# Patient Record
Sex: FEMALE | Race: WHITE | Employment: FULL TIME | URBAN - METROPOLITAN AREA
[De-identification: names, ages, dates, MRNs, and addresses within clinical notes are randomized per-mention and may not be internally consistent; named-entity substitution may affect disease eponyms.]

---

## 2017-01-26 ENCOUNTER — HOSPITAL ENCOUNTER (EMERGENCY)
Facility: HOSPITAL | Age: 22
Discharge: HOME | End: 2017-01-26
Attending: EMERGENCY MEDICINE
Payer: COMMERCIAL

## 2017-01-26 VITALS
BODY MASS INDEX: 34.36 KG/M2 | TEMPERATURE: 100 F | DIASTOLIC BLOOD PRESSURE: 58 MMHG | SYSTOLIC BLOOD PRESSURE: 111 MMHG | HEART RATE: 104 BPM | WEIGHT: 182 LBS | OXYGEN SATURATION: 100 % | RESPIRATION RATE: 20 BRPM | HEIGHT: 61 IN

## 2017-01-26 DIAGNOSIS — J11.1 INFLUENZA-LIKE ILLNESS: Primary | ICD-10-CM

## 2017-01-26 LAB
ANION GAP SERPL CALC-SCNC: 12 MMOL/L (ref 0–18)
B-HCG UR QL: NEGATIVE
BASOPHILS # BLD: 0 K/UL (ref 0–0.2)
BASOPHILS NFR BLD: 0 %
BILIRUB UR QL: NEGATIVE
BUN SERPL-MCNC: 14 MG/DL (ref 8–20)
BUN/CREAT SERPL: 17.9 (ref 10–20)
CALCIUM SERPL-MCNC: 9 MG/DL (ref 8.5–10.5)
CHLORIDE SERPL-SCNC: 100 MMOL/L (ref 95–110)
CLARITY UR: CLEAR
CO2 SERPL-SCNC: 23 MMOL/L (ref 22–32)
COLOR UR: YELLOW
CREAT SERPL-MCNC: 0.78 MG/DL (ref 0.5–1.5)
EOSINOPHIL # BLD: 0 K/UL (ref 0–0.7)
EOSINOPHIL NFR BLD: 0 %
ERYTHROCYTE [DISTWIDTH] IN BLOOD BY AUTOMATED COUNT: 12.7 % (ref 11–15)
GLUCOSE SERPL-MCNC: 105 MG/DL (ref 70–99)
GLUCOSE UR-MCNC: NEGATIVE MG/DL
HCT VFR BLD AUTO: 39.1 % (ref 35–48)
HGB BLD-MCNC: 13.2 G/DL (ref 12–16)
KETONES UR-MCNC: NEGATIVE MG/DL
LYMPHOCYTES # BLD: 0.4 K/UL (ref 1–4)
LYMPHOCYTES NFR BLD: 5 %
MCH RBC QN AUTO: 27.2 PG (ref 27–32)
MCHC RBC AUTO-ENTMCNC: 33.8 G/DL (ref 32–37)
MCV RBC AUTO: 80.5 FL (ref 80–100)
MONOCYTES # BLD: 0.6 K/UL (ref 0–1)
MONOCYTES NFR BLD: 8 %
NEUTROPHILS # BLD AUTO: 7.5 K/UL (ref 1.8–7.7)
NEUTROPHILS NFR BLD: 87 %
NITRITE UR QL STRIP.AUTO: NEGATIVE
OSMOLALITY UR CALC.SUM OF ELEC: 281 MOSM/KG (ref 275–295)
PH UR: 8 [PH] (ref 5–8)
PLATELET # BLD AUTO: 244 K/UL (ref 140–400)
PMV BLD AUTO: 8 FL (ref 7.4–10.3)
POTASSIUM SERPL-SCNC: 3.8 MMOL/L (ref 3.3–5.1)
PROT UR-MCNC: NEGATIVE MG/DL
RBC # BLD AUTO: 4.86 M/UL (ref 3.7–5.4)
RBC #/AREA URNS AUTO: 2 /HPF
SODIUM SERPL-SCNC: 135 MMOL/L (ref 136–144)
SP GR UR STRIP: 1.01 (ref 1–1.03)
UROBILINOGEN UR STRIP-ACNC: <2
VIT C UR-MCNC: NEGATIVE MG/DL
WBC # BLD AUTO: 8.6 K/UL (ref 4–11)
WBC #/AREA URNS AUTO: 1 /HPF

## 2017-01-26 PROCEDURE — 81025 URINE PREGNANCY TEST: CPT

## 2017-01-26 PROCEDURE — 36415 COLL VENOUS BLD VENIPUNCTURE: CPT

## 2017-01-26 PROCEDURE — 80048 BASIC METABOLIC PNL TOTAL CA: CPT | Performed by: EMERGENCY MEDICINE

## 2017-01-26 PROCEDURE — 99283 EMERGENCY DEPT VISIT LOW MDM: CPT

## 2017-01-26 PROCEDURE — 85025 COMPLETE CBC W/AUTO DIFF WBC: CPT | Performed by: EMERGENCY MEDICINE

## 2017-01-26 PROCEDURE — 81001 URINALYSIS AUTO W/SCOPE: CPT

## 2017-01-26 RX ORDER — ONDANSETRON 4 MG/1
4 TABLET, ORALLY DISINTEGRATING ORAL ONCE
Status: COMPLETED | OUTPATIENT
Start: 2017-01-26 | End: 2017-01-26

## 2017-01-26 RX ORDER — IBUPROFEN 600 MG/1
600 TABLET ORAL ONCE
Status: COMPLETED | OUTPATIENT
Start: 2017-01-26 | End: 2017-01-26

## 2017-01-26 RX ORDER — ONDANSETRON 4 MG/1
TABLET, ORALLY DISINTEGRATING ORAL
Status: COMPLETED
Start: 2017-01-26 | End: 2017-01-26

## 2017-01-26 NOTE — ED PROVIDER NOTES
Patient Seen in: [facility] AND [facility] Emergency Department    History   Patient presents with:  Fever Sepsis (infectious)    Stated Complaint:     HPI    23 yo female with shaking chills, nausea and body aches. No cough. Has had sore throat.  Low grade fever Normal range of motion. She exhibits no edema or tenderness. Lymphadenopathy:     She has no cervical adenopathy. Neurological: She is alert and oriented to person, place, and time. No cranial nerve deficit. Skin: Skin is warm and dry.    Psychiatric:

## 2017-03-25 ENCOUNTER — OFFICE VISIT (OUTPATIENT)
Dept: FAMILY MEDICINE CLINIC | Facility: CLINIC | Age: 22
End: 2017-03-25

## 2017-03-25 VITALS
TEMPERATURE: 98 F | BODY MASS INDEX: 32.6 KG/M2 | HEIGHT: 63.25 IN | HEART RATE: 93 BPM | WEIGHT: 186.31 LBS | DIASTOLIC BLOOD PRESSURE: 76 MMHG | SYSTOLIC BLOOD PRESSURE: 111 MMHG

## 2017-03-25 DIAGNOSIS — Z00.00 ROUTINE GENERAL MEDICAL EXAMINATION AT A HEALTH CARE FACILITY: Primary | ICD-10-CM

## 2017-03-25 DIAGNOSIS — Z11.1 SCREENING FOR TUBERCULOSIS: ICD-10-CM

## 2017-03-25 PROCEDURE — 90471 IMMUNIZATION ADMIN: CPT | Performed by: PHYSICIAN ASSISTANT

## 2017-03-25 PROCEDURE — 86580 TB INTRADERMAL TEST: CPT | Performed by: PHYSICIAN ASSISTANT

## 2017-03-25 PROCEDURE — 90707 MMR VACCINE SC: CPT | Performed by: PHYSICIAN ASSISTANT

## 2017-03-25 PROCEDURE — 99385 PREV VISIT NEW AGE 18-39: CPT | Performed by: PHYSICIAN ASSISTANT

## 2017-03-25 NOTE — PROGRESS NOTES
HPI:   Court Cano is a 24year old female who presents for physical exam and Tb test for work. 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 She denies any significant medical history. Periods are regular. She denies acute problems or concerns.     Current M splenectomy. ENDOCRINOLOGIC:  Denies excessive sweating, cold or heat intolerance, polyuria or polydipsia. ALLERGIES:  Denies severe allergic response, history of asthma, sneezing, hives, eczema or rhinitis.     Exam:   /76 mmHg  Pulse 93  Temp(Src) 3/25/2018).     RENATA Dye  3/25/2017

## 2017-03-27 ENCOUNTER — NURSE ONLY (OUTPATIENT)
Dept: FAMILY MEDICINE CLINIC | Facility: CLINIC | Age: 22
End: 2017-03-27

## 2017-03-27 DIAGNOSIS — Z23 IMMUNIZATION DUE: Primary | ICD-10-CM

## 2017-03-27 LAB — INDURATION (): 0 MM (ref 0–11)

## 2017-05-17 ENCOUNTER — OFFICE VISIT (OUTPATIENT)
Dept: FAMILY MEDICINE CLINIC | Facility: CLINIC | Age: 22
End: 2017-05-17

## 2017-05-17 VITALS
WEIGHT: 185.5 LBS | TEMPERATURE: 99 F | BODY MASS INDEX: 33 KG/M2 | DIASTOLIC BLOOD PRESSURE: 73 MMHG | HEART RATE: 81 BPM | SYSTOLIC BLOOD PRESSURE: 106 MMHG

## 2017-05-17 DIAGNOSIS — H00.021 HORDEOLUM INTERNUM OF RIGHT UPPER EYELID: Primary | ICD-10-CM

## 2017-05-17 PROCEDURE — 99212 OFFICE O/P EST SF 10 MIN: CPT | Performed by: PHYSICIAN ASSISTANT

## 2017-05-17 PROCEDURE — 99213 OFFICE O/P EST LOW 20 MIN: CPT | Performed by: PHYSICIAN ASSISTANT

## 2017-05-17 RX ORDER — OFLOXACIN 3 MG/ML
1 SOLUTION/ DROPS OPHTHALMIC 4 TIMES DAILY
Qty: 10 ML | Refills: 0 | Status: SHIPPED | OUTPATIENT
Start: 2017-05-17 | End: 2017-09-06 | Stop reason: ALTCHOICE

## 2017-05-17 RX ORDER — ERYTHROMYCIN 5 MG/G
1 OINTMENT OPHTHALMIC 2 TIMES DAILY
Qty: 3.5 G | Refills: 0 | Status: SHIPPED | OUTPATIENT
Start: 2017-05-17 | End: 2017-09-06 | Stop reason: ALTCHOICE

## 2017-05-17 NOTE — PROGRESS NOTES
HPI:    Patient ID: Desmond Bailey is a 25year old female. HPI Comments: Patient presents for swelling and redness of right upper eyelid for past two days. She has had mucus drainage from eye. She has mild pain in eyelid and area has been itchy.   Brian Doyle No cranial nerve deficit. Skin: Skin is warm and dry. Psychiatric: She has a normal mood and affect. Vitals reviewed.              ASSESSMENT/PLAN:   Hordeolum internum of right upper eyelid  (primary encounter diagnosis)  -Ofloxacin drops and erythro

## 2017-06-13 ENCOUNTER — TELEPHONE (OUTPATIENT)
Dept: FAMILY MEDICINE CLINIC | Facility: CLINIC | Age: 22
End: 2017-06-13

## 2017-06-13 NOTE — TELEPHONE ENCOUNTER
Pt calling and stts she faxed over DCFS forms for her to open a . Pt stts she faxed them on Thursday 6/08. Pt stts that it was for Dr. Olman Plata to fill out. Pt would like to know if they were received and filled out?   Please fax #[phone]

## 2017-09-06 ENCOUNTER — OFFICE VISIT (OUTPATIENT)
Dept: FAMILY MEDICINE CLINIC | Facility: CLINIC | Age: 22
End: 2017-09-06

## 2017-09-06 ENCOUNTER — APPOINTMENT (OUTPATIENT)
Dept: LAB | Age: 22
End: 2017-09-06
Attending: PHYSICIAN ASSISTANT
Payer: COMMERCIAL

## 2017-09-06 VITALS
HEART RATE: 71 BPM | HEIGHT: 63 IN | BODY MASS INDEX: 32.43 KG/M2 | WEIGHT: 183 LBS | SYSTOLIC BLOOD PRESSURE: 123 MMHG | DIASTOLIC BLOOD PRESSURE: 75 MMHG

## 2017-09-06 DIAGNOSIS — N92.6 IRREGULAR PERIODS: ICD-10-CM

## 2017-09-06 DIAGNOSIS — N92.6 MISSED PERIOD: ICD-10-CM

## 2017-09-06 DIAGNOSIS — N92.6 MISSED PERIOD: Primary | ICD-10-CM

## 2017-09-06 LAB
B-HCG SERPL-ACNC: <0.6 MIU/ML
CONTROL LINE PRESENT WITH A CLEAR BACKGROUND (YES/NO): YES YES/NO
KIT LOT #: NORMAL NUMERIC
PREGNANCY TEST, URINE: NEGATIVE
TSH SERPL-ACNC: 4.72 UIU/ML (ref 0.45–5.33)

## 2017-09-06 PROCEDURE — 81025 URINE PREGNANCY TEST: CPT | Performed by: PHYSICIAN ASSISTANT

## 2017-09-06 PROCEDURE — 84443 ASSAY THYROID STIM HORMONE: CPT

## 2017-09-06 PROCEDURE — 99213 OFFICE O/P EST LOW 20 MIN: CPT | Performed by: PHYSICIAN ASSISTANT

## 2017-09-06 PROCEDURE — 84702 CHORIONIC GONADOTROPIN TEST: CPT

## 2017-09-06 PROCEDURE — 36415 COLL VENOUS BLD VENIPUNCTURE: CPT

## 2017-09-06 NOTE — PROGRESS NOTES
HPI:    Patient ID: Bam Elizabeth is a 25year old female. Patient presents for missed period. Her last period was 7/11/17. She states has never missed period before and that she is usually very regular.   She also complains of feeling \"sick\" afte Referrals:  [facility] - INTERNAL  US PELVIS (TRANSABDOMINAL AND TRANSVAGINAL) (CPT=76856/53968)       AY#[id number]

## 2017-09-07 ENCOUNTER — TELEPHONE (OUTPATIENT)
Dept: FAMILY MEDICINE CLINIC | Facility: CLINIC | Age: 22
End: 2017-09-07

## 2017-09-15 ENCOUNTER — HOSPITAL ENCOUNTER (OUTPATIENT)
Dept: ULTRASOUND IMAGING | Age: 22
Discharge: HOME | End: 2017-09-15
Attending: PHYSICIAN ASSISTANT
Payer: COMMERCIAL

## 2017-09-15 DIAGNOSIS — N92.6 MISSED PERIOD: ICD-10-CM

## 2017-09-15 PROCEDURE — 76856 US EXAM PELVIC COMPLETE: CPT | Performed by: PHYSICIAN ASSISTANT

## 2017-09-15 PROCEDURE — 76830 TRANSVAGINAL US NON-OB: CPT | Performed by: PHYSICIAN ASSISTANT

## 2017-09-18 ENCOUNTER — TELEPHONE (OUTPATIENT)
Dept: FAMILY MEDICINE CLINIC | Facility: CLINIC | Age: 22
End: 2017-09-18

## 2017-09-18 DIAGNOSIS — E28.2 POLYCYSTIC OVARIES: Primary | ICD-10-CM

## 2017-09-18 NOTE — TELEPHONE ENCOUNTER
Pt is calling state that she is returning a call back to [facility] in regard to some labs result pt is requesting a call back

## 2017-09-18 NOTE — TELEPHONE ENCOUNTER
Notes Recorded by RENATA Jang on 9/18/2017 at 10:20 AM CDT  Left detailed voice message for patient regarding results of pelvic ultrasound.  Both ovaries have multiple tiny follicles which may be due to PCOS but ovaries are not enlarged.  Advised

## 2017-10-06 ENCOUNTER — OFFICE VISIT (OUTPATIENT)
Dept: ENDOCRINOLOGY CLINIC | Facility: CLINIC | Age: 22
End: 2017-10-06

## 2017-10-06 VITALS
BODY MASS INDEX: 33.38 KG/M2 | HEIGHT: 63 IN | HEART RATE: 76 BPM | WEIGHT: 188.38 LBS | DIASTOLIC BLOOD PRESSURE: 74 MMHG | SYSTOLIC BLOOD PRESSURE: 113 MMHG

## 2017-10-06 DIAGNOSIS — Z13.220 LIPID SCREENING: ICD-10-CM

## 2017-10-06 DIAGNOSIS — L68.0 HIRSUTISM: ICD-10-CM

## 2017-10-06 DIAGNOSIS — E28.2 PCOS (POLYCYSTIC OVARIAN SYNDROME): Primary | ICD-10-CM

## 2017-10-06 DIAGNOSIS — N92.6 IRREGULAR MENSTRUAL CYCLE: ICD-10-CM

## 2017-10-06 DIAGNOSIS — Z13.1 DIABETES MELLITUS SCREENING: ICD-10-CM

## 2017-10-06 PROCEDURE — 99244 OFF/OP CNSLTJ NEW/EST MOD 40: CPT | Performed by: INTERNAL MEDICINE

## 2017-10-06 PROCEDURE — 36416 COLLJ CAPILLARY BLOOD SPEC: CPT | Performed by: INTERNAL MEDICINE

## 2017-10-06 PROCEDURE — 83036 HEMOGLOBIN GLYCOSYLATED A1C: CPT | Performed by: INTERNAL MEDICINE

## 2017-10-06 NOTE — H&P
New Patient Evaluation - History and Physical    CONSULT - Reason for Visit: Hirsutism, Irregular menstrual cycles, elevated TT  Requesting Provider:  RENATA Rouse        CHIEF COMPLAINT:  Patient presents with:   Other: PCOS       HISTORY OF PRESEN weight gain  Eyes: Negative for:  Visual changes, proptosis, blurring  ENT: Negative for:  dysphagia, neck swelling, dysphonia  Respiratory: Negative for:  dyspnea, cough  Cardiovascular: Negative for:  chest pain, palpitations, orthopnea  GI: Negative for - Discussed OCPs and SPRL. These are not options right now given that she is considering pregnancy in the near future.     2.  Fertility/ menstrual cycles  - Discussed that weight loss alone can restore ovulation in overweight and obese patients  - Will s

## 2017-10-25 ENCOUNTER — TELEPHONE (OUTPATIENT)
Dept: OTHER | Age: 22
End: 2017-10-25

## 2017-10-25 DIAGNOSIS — L98.9 SKIN LESION: Primary | ICD-10-CM

## 2017-10-25 NOTE — TELEPHONE ENCOUNTER
Pt called seeking advise regarding loss of pigmentation (2 years ago) on left side of neck about the size of 3 quarters ,asking if there was a cream she could use or do she need see a Dermatologist ,also have a Tag mole on her neck that needs to be removed

## 2017-10-27 ENCOUNTER — OFFICE VISIT (OUTPATIENT)
Dept: OBGYN CLINIC | Facility: CLINIC | Age: 22
End: 2017-10-27

## 2017-10-27 VITALS
HEART RATE: 80 BPM | SYSTOLIC BLOOD PRESSURE: 107 MMHG | WEIGHT: 188 LBS | DIASTOLIC BLOOD PRESSURE: 72 MMHG | BODY MASS INDEX: 33 KG/M2

## 2017-10-27 DIAGNOSIS — Z01.419 ENCOUNTER FOR GYNECOLOGICAL EXAMINATION WITHOUT ABNORMAL FINDING: Primary | ICD-10-CM

## 2017-10-27 DIAGNOSIS — Z12.4 CERVICAL CANCER SCREENING: ICD-10-CM

## 2017-10-27 PROCEDURE — 99385 PREV VISIT NEW AGE 18-39: CPT | Performed by: OBSTETRICS & GYNECOLOGY

## 2017-10-27 NOTE — PROGRESS NOTES
Well Woman Exam    HPI:  The patient is a 18yo female who presents for annual exam. She skipped her menses in August and was seen by endocrinology for full work up. She notes Hirsutism as well. She has had regular menses for the last two years.  She does no discharge. Neurological:  denies headaches, extremity weakness  Psychiatric: denies depression or anxiety.        10/27/17  1421   BP: 107/72   Pulse: 80       PHYSICAL EXAM:   Constitutional: well developed, well nourished  Head/Face: normocephalic, exce

## 2017-10-27 NOTE — TELEPHONE ENCOUNTER
LMTCB transfer to triage    Note      Referral to dermatology done        Note      Noted. Please inform patient referral has been entered.

## 2017-10-27 NOTE — TELEPHONE ENCOUNTER
Patient returning a call and advised about Dr Wilian Gutierrez note, dermatology number [phone] given to make an appointment. Patient verbalized understanding.     Note      Referral to dermatology done

## 2018-10-16 ENCOUNTER — HOSPITAL ENCOUNTER (EMERGENCY)
Facility: HOSPITAL | Age: 23
Discharge: HOME | End: 2018-10-16
Payer: MEDICAID

## 2018-10-16 VITALS
OXYGEN SATURATION: 98 % | WEIGHT: 179 LBS | HEIGHT: 62 IN | DIASTOLIC BLOOD PRESSURE: 57 MMHG | HEART RATE: 60 BPM | BODY MASS INDEX: 32.94 KG/M2 | SYSTOLIC BLOOD PRESSURE: 103 MMHG | TEMPERATURE: 99 F | RESPIRATION RATE: 16 BRPM

## 2018-10-16 DIAGNOSIS — L72.9 SKIN CYST: Primary | ICD-10-CM

## 2018-10-16 PROCEDURE — 99283 EMERGENCY DEPT VISIT LOW MDM: CPT

## 2018-10-16 NOTE — ED NOTES
Pt noted draining bump to nape of neck near hairline approx 1 month ago but it has not drained since. The past 2 days, bump has been sore and tender. Dime-sized area of localized redness. Denies fever or vomiting. NP Fariha to examine further.

## 2018-10-16 NOTE — ED PROVIDER NOTES
Patient Seen in: [facility] Emergency Department    History   Patient presents with:  Abscess (integumentary)    Stated Complaint: Abscess on Neck    HPI    26-year-old female presents to the emergency department with a bump to the back of her [facility] Nursing note and vitals reviewed.         ED Course   Labs Reviewed - No data to display             MDM   No infection noted at this time advised to follow with dermatology or surgery suspect cyst vs lipoma  For further evaluation patient is agreeable wi

## 2018-10-16 NOTE — ED NOTES
Discharged home with instructions to follow-up with Dr Rosemarie Geller or dermatologist of choice. To return to ER for any new or worsening symptoms.

## 2022-09-12 ENCOUNTER — APPOINTMENT (OUTPATIENT)
Dept: URBAN - METROPOLITAN AREA CLINIC 247 | Age: 27
Setting detail: DERMATOLOGY
End: 2022-09-12

## 2022-09-12 DIAGNOSIS — Z41.1 ENCOUNTER FOR COSMETIC SURGERY: ICD-10-CM

## 2022-09-12 PROCEDURE — OTHER CONSULTATION - BREAST AUGMENTATION: OTHER

## 2022-09-12 ASSESSMENT — LOCATION DETAILED DESCRIPTION DERM
LOCATION DETAILED: RIGHT NIPPLE
LOCATION DETAILED: RIGHT MEDIAL BREAST 2-3:00 REGION

## 2022-09-12 ASSESSMENT — LOCATION ZONE DERM: LOCATION ZONE: TRUNK

## 2022-09-12 ASSESSMENT — LOCATION SIMPLE DESCRIPTION DERM: LOCATION SIMPLE: RIGHT BREAST

## 2022-09-12 NOTE — PROCEDURE: CONSULTATION - BREAST AUGMENTATION
Detail Level: Detailed
Consultation Charge $ (Use Numbers Only, No Text Please.): 100
Count Minor/Major Decisions Toward Mdm (Not Cosmetic)?: No

## 2022-12-01 ENCOUNTER — APPOINTMENT (OUTPATIENT)
Dept: URBAN - METROPOLITAN AREA CLINIC 247 | Age: 27
Setting detail: DERMATOLOGY
End: 2022-12-01

## 2022-12-01 DIAGNOSIS — Z41.1 ENCOUNTER FOR COSMETIC SURGERY: ICD-10-CM

## 2022-12-01 PROCEDURE — OTHER ADDITIONAL NOTES: OTHER

## 2022-12-01 PROCEDURE — OTHER PRE-OP WORKLIST: OTHER

## 2022-12-01 NOTE — PROCEDURE: ADDITIONAL NOTES
Render Risk Assessment In Note?: no
Additional Notes: A pre-operative visit was held with the patient today, which included a review of the procedure, benefits, likely outcome, alternatives, and risks of surgery as described by the ASPS guidelines. These include, but are not limited to bleeding, infection, hematoma, seroma, wound breakdown, scarring, asymmetry, injury to surrounding structures, need for repeat procedures, poor cosmetic result, venous thromboembolism, cardiovascular events and even death. All questions were answered and the patient was instructed to call the office if any further questions arise.\\n\\n Surgical consent obtained and scanned into chart, Pre-operative photos obtained and scanned into chart, Implants/devices ordered, Mammogram within the last year reviewed with normal findings, Patient holding all blood thinners, herbal medications, hormonal supplements, Post-operative pain protocol discussed and prescriptions provided to patient, Patient has been seen by primary care provider and work-up performed finding that patient is acceptable candidate for surgery.\\n\\n Pre-operative and post-operative instructions were provided to patient, Breast Implants-A thorough discussion on breast implants was had with the patient, and an appropriately sized pair of implants was selected by the patient based on her anatomy, breast measurements, goals and desires. \\n\\nThe risks, benefits and alternatives were discussed including, but not limited to, capsular contracture, implant failure(rupture,leakage), the risk for breast implant associated anaplastic large cell lymphoma(HAJA-ALCL), malposition, asymmetry, rippling, changes in nipple sensation. \\n\\nThe patient is aware of the FDA recommendation for MRI screening at 3 years and then every 2 years with silicone gel implants.\\n\\n Drains- The possibility of drain placement during surgery was discussed in order to minimize the risk for seroma and swelling. The duration of this drain will be determined by its quality and quantity over time. Drain education was performed., We discussed that re-operations are sometimes necessary after cosmetic surgery, and that the patient would be responsible for these additional costs.

## 2022-12-13 ENCOUNTER — APPOINTMENT (OUTPATIENT)
Dept: URBAN - METROPOLITAN AREA CLINIC 247 | Age: 27
Setting detail: DERMATOLOGY
End: 2022-12-13

## 2022-12-13 DIAGNOSIS — Z48.89 ENCOUNTER FOR OTHER SPECIFIED SURGICAL AFTERCARE: ICD-10-CM

## 2022-12-13 PROCEDURE — OTHER COUNSELING - POST-OP CHECK: OTHER

## 2022-12-13 PROCEDURE — 99024 POSTOP FOLLOW-UP VISIT: CPT

## 2022-12-22 ENCOUNTER — APPOINTMENT (OUTPATIENT)
Dept: URBAN - METROPOLITAN AREA CLINIC 248 | Age: 27
Setting detail: DERMATOLOGY
End: 2022-12-22

## 2022-12-22 DIAGNOSIS — Z48.89 ENCOUNTER FOR OTHER SPECIFIED SURGICAL AFTERCARE: ICD-10-CM

## 2022-12-22 PROCEDURE — OTHER COUNSELING - POST-OP CHECK: OTHER

## 2022-12-22 NOTE — HPI: POST-OP CHECK
History: This is a 27 year old female who is being seen for a post-op check, to assess healing\\nBilateral breast augmentation with silicone implants 12/9/2022\\n

## 2023-01-03 ENCOUNTER — APPOINTMENT (OUTPATIENT)
Dept: URBAN - METROPOLITAN AREA CLINIC 247 | Age: 28
Setting detail: DERMATOLOGY
End: 2023-01-05

## 2023-01-03 DIAGNOSIS — Z48.89 ENCOUNTER FOR OTHER SPECIFIED SURGICAL AFTERCARE: ICD-10-CM

## 2023-01-03 PROCEDURE — 99024 POSTOP FOLLOW-UP VISIT: CPT

## 2023-01-03 PROCEDURE — OTHER COUNSELING - POST-OP CHECK: OTHER

## 2023-01-03 PROCEDURE — OTHER CODE 99024 - NO CHARGE POST-OP VISIT: OTHER

## 2023-01-03 ASSESSMENT — LOCATION SIMPLE DESCRIPTION DERM: LOCATION SIMPLE: LEFT BREAST

## 2023-01-03 ASSESSMENT — LOCATION DETAILED DESCRIPTION DERM: LOCATION DETAILED: LEFT MEDIAL BREAST 8-9:00 REGION

## 2023-01-03 ASSESSMENT — LOCATION ZONE DERM: LOCATION ZONE: TRUNK

## 2023-01-31 ENCOUNTER — APPOINTMENT (OUTPATIENT)
Dept: URBAN - METROPOLITAN AREA CLINIC 247 | Age: 28
Setting detail: DERMATOLOGY
End: 2023-02-01

## 2023-01-31 DIAGNOSIS — Z48.89 ENCOUNTER FOR OTHER SPECIFIED SURGICAL AFTERCARE: ICD-10-CM

## 2023-01-31 PROCEDURE — OTHER COUNSELING - POST-OP CHECK: OTHER

## 2023-01-31 PROCEDURE — OTHER CODE 99024 - NO CHARGE POST-OP VISIT: OTHER

## 2023-01-31 PROCEDURE — 99024 POSTOP FOLLOW-UP VISIT: CPT

## 2023-04-11 ENCOUNTER — APPOINTMENT (OUTPATIENT)
Dept: URBAN - METROPOLITAN AREA CLINIC 247 | Age: 28
Setting detail: DERMATOLOGY
End: 2023-04-11

## 2023-04-11 DIAGNOSIS — Z48.89 ENCOUNTER FOR OTHER SPECIFIED SURGICAL AFTERCARE: ICD-10-CM

## 2023-04-11 PROCEDURE — OTHER COUNSELING - POST-OP CHECK: OTHER

## 2023-04-11 PROCEDURE — 99024 POSTOP FOLLOW-UP VISIT: CPT

## (undated) NOTE — ED AVS SNAPSHOT
Kayla Victorino   MRN: [id number]    Department:  [facility] Emergency Department   Date of Visit:  10/16/2018           Disclosure     Insurance plans vary and the physician(s) referred by the ER may not be covered by your plan.  Please contac CARE PHYSICIAN AT ONCE OR RETURN IMMEDIATELY TO THE EMERGENCY DEPARTMENT. If you have been prescribed any medication(s), please fill your prescription right away and begin taking the medication(s) as directed.   If you believe that any of the medications

## (undated) NOTE — Clinical Note
Thank you for the consult. I saw  Ms. Tamy Duran in the endocrine/diabetes clinic today. Please see attached my note. Please feel free to contact me with any questions. Thanks!

## (undated) NOTE — ED AVS SNAPSHOT
[facility] Emergency Department    [address]     [place] [address]    Phone:  [phone]    Fax:  [phone]           Praveena Simmons   MRN: [id number]    Department:  [facility] Emergency Department   Date of Visit:  1/2 It is our goal to assure that you are completely satisfied with every aspect of your visit today.   In an effort to constantly improve our service to you, we would appreciate any positive or negative feedback related to the care you received in our emergenc [employer] account. You may have had testing done that requires us to contact you. Please make sure we have your correct phone number on file.       I certified that I have received a copy of the aftercare instructions; that these instructions have been expl information, go to [email or web address] and click on the Sign Up Now link in the [employer] box. Enter your [employer] Activation Code exactly as it appears below along with your Zip Code and Date of Birth to complete the sign-up process.  If you do

## (undated) NOTE — MR AVS SNAPSHOT
[facility] SPECIALTY [facility] [place] - [place] [address] [address] [phone]               Thank you for choosing us for your health care visit with Nurse. We are glad to serve you and happy to provide you with this summary of your visit.

## (undated) NOTE — MR AVS SNAPSHOT
[facility] SPECIALTY \[facility]\"" - [place] [address] [address] [id number] [phone]               Thank you for choosing us for your health care visit with RENATA Mcgee.   We are glad to serve you and happy to provide you with this summary of Your unique [employer] Access Code: [email or web address]  Expires: 3/27/2017  5:44 AM    If you have questions, you can call [phone] to talk to our [facility] Staff. Remember, [employer] is NOT to be used for urgent needs. For medical emergencies, dial 911.

## (undated) NOTE — ED AVS SNAPSHOT
[facility] Emergency Department    [place] [facility] [address]    Phone:  [phone]    Fax:  [phone]           Dylan Woods   MRN: [id number]    Department:  [facility] Emergency Department   Date of Visit:  1/2 and Class Registration line at [phone] or find a doctor online by visiting [email or web address].    IF THERE IS ANY CHANGE OR WORSENING OF YOUR CONDITION, CALL YOUR PRIMARY CARE PHYSICIAN AT ONCE OR RETURN IMMEDIATELY TO [address] [place] [address].     If

## (undated) NOTE — LETTER
January 26, 2017    Patient: Dylan Woods   Date of Visit: 1/26/2017       To Whom It May Concern:    Dylan Woods was seen and treated in our emergency department on 1/26/2017. She should not return to work until 1/30/17.     If you have any qu

## (undated) NOTE — MR AVS SNAPSHOT
[facility] - [place] [address] [address] [phone]  [phone]               Thank you for choosing us for your health care visit with RENATA García.   We are glad to serve you and happy to provide you with this summary of Now link in the [email or web address]. Enter your [employer] Activation Code exactly as it appears below along with your Zip Code and Date of Birth to complete the sign-up process. If you do not sign up before the expiration date, you must request a new code.     Adrian Ruiz